# Patient Record
Sex: FEMALE | Race: WHITE | Employment: FULL TIME | URBAN - METROPOLITAN AREA
[De-identification: names, ages, dates, MRNs, and addresses within clinical notes are randomized per-mention and may not be internally consistent; named-entity substitution may affect disease eponyms.]

---

## 2017-03-03 ENCOUNTER — ALLSCRIPTS OFFICE VISIT (OUTPATIENT)
Dept: OTHER | Facility: OTHER | Age: 37
End: 2017-03-03

## 2017-04-10 ENCOUNTER — ALLSCRIPTS OFFICE VISIT (OUTPATIENT)
Dept: OTHER | Facility: OTHER | Age: 37
End: 2017-04-10

## 2017-10-19 ENCOUNTER — GENERIC CONVERSION - ENCOUNTER (OUTPATIENT)
Dept: OTHER | Facility: OTHER | Age: 37
End: 2017-10-19

## 2017-10-19 PROCEDURE — 87624 HPV HI-RISK TYP POOLED RSLT: CPT | Performed by: OBSTETRICS & GYNECOLOGY

## 2017-10-19 PROCEDURE — G0143 SCR C/V CYTO,THINLAYER,RESCR: HCPCS | Performed by: OBSTETRICS & GYNECOLOGY

## 2017-10-22 ENCOUNTER — LAB REQUISITION (OUTPATIENT)
Dept: LAB | Facility: HOSPITAL | Age: 37
End: 2017-10-22
Payer: COMMERCIAL

## 2017-10-22 DIAGNOSIS — Z01.419 ENCOUNTER FOR GYNECOLOGICAL EXAMINATION WITHOUT ABNORMAL FINDING: ICD-10-CM

## 2017-10-24 LAB — HPV RRNA GENITAL QL NAA+PROBE: NORMAL

## 2017-10-30 LAB
LAB AP GYN PRIMARY INTERPRETATION: NORMAL
Lab: NORMAL

## 2017-10-31 ENCOUNTER — GENERIC CONVERSION - ENCOUNTER (OUTPATIENT)
Dept: OTHER | Facility: OTHER | Age: 37
End: 2017-10-31

## 2017-11-09 ENCOUNTER — LAB REQUISITION (OUTPATIENT)
Dept: LAB | Facility: HOSPITAL | Age: 37
End: 2017-11-09
Payer: COMMERCIAL

## 2017-11-09 ENCOUNTER — GENERIC CONVERSION - ENCOUNTER (OUTPATIENT)
Dept: OTHER | Facility: OTHER | Age: 37
End: 2017-11-09

## 2017-11-09 DIAGNOSIS — N92.1 EXCESSIVE AND FREQUENT MENSTRUATION WITH IRREGULAR CYCLE: ICD-10-CM

## 2017-11-09 DIAGNOSIS — E66.01 MORBID (SEVERE) OBESITY DUE TO EXCESS CALORIES (HCC): ICD-10-CM

## 2017-11-09 PROCEDURE — 88305 TISSUE EXAM BY PATHOLOGIST: CPT | Performed by: OBSTETRICS & GYNECOLOGY

## 2017-11-15 ENCOUNTER — GENERIC CONVERSION - ENCOUNTER (OUTPATIENT)
Dept: OTHER | Facility: OTHER | Age: 37
End: 2017-11-15

## 2018-01-11 NOTE — RESULT NOTES
Verified Results  (1) THIN PREP PAP WITH IMAGING [id number] 07:47AM Juan Lomax     Test Name Result Flag Reference   LAB AP CASE REPORT (Report)     Gynecologic Cytology Report            Case: [id number]                  Authorizing Provider: Stephanie Winters MD     Collected:      10/19/2017           First Screen:     JESSICA Liz    Received:      10/23/2017 1042        Specimen:  LIQUID-BASED PAP, SCREENING, Cervix   HPV HIGH RISK RESULT (Report)     HPV, High Risk: HPV NEG, HPV16 NEG, HPV18 NEG      Other High Risk HPV Negative, HPV 16 Negative, HPV 18 Negative  HPV types: 16,18,31,33,35,39,45,51,52,56,58,59,66 and 68 DNA are undetectable or below the pre-set threshold  [employer] FDA approved Jose 4800 is utilized with strict adherence to the manufacturers instruction  manual to test for the presence of High-Risk HPV DNA, as well as HPV 16 and HPV 18  This instrument  has been validated by our laboratory and/or by the   A negative result does not preclude the presence of HPV infection because results depend on adequate  specimen collection, absence of inhibitors and sufficient DNA to be detected  Additionally, HPV negative  results are not intended to prevent women from proceeding to colposcopy if clinically warranted  Positive HPV test results indicate the presence of any one or more of the high risk types, but since patients  are often co-infected with low-risk types it does not rule out the presence of low-risk types in patients  with mixed infections  LAB AP GYN PRIMARY INTERPRETATION      Negative for intraepithelial lesion or malignancy  Electronically signed by JESSICA Liz on 10/30/2017 at 7:47 AM   LAB AP GYN SPECIMEN ADEQUACY      Satisfactory for evaluation  Endocervical/transformation zone component present  LAB AP GYN NOTE      This specimen was analyzed by the Thin Prep Imaging System   Due to   technical Imaging issues or the physical properties of the slide specimen,   comprehensive manual rescreening by a Cytotechnologist, and/or Pathologist   was indicated  LAB AP GYN ADDITIONAL INFORMATION (Report)     [employer]'s FDA approved ,  and ThinPrep Imaging System are   utilized with strict adherence to the 's instruction manual to   prepare gynecologic and non-gynecologic cytology specimens for the   production of ThinPrep slides as well as for gynecologic ThinPrep imaging  These processes have been validated by our laboratory and/or by the     The Pap test is not a diagnostic procedure and should not be used as the   sole means to detect cervical cancer  It is only a screening procedure to   aid in the detection of cervical cancer and its precursors  Both   false-negative and false-positive results have been experienced  Your   patient's test result should be interpreted in this context together with   the history and clinical findings

## 2018-01-13 NOTE — RESULT NOTES
Verified Results  (1) TISSUE EXAM [id number] 04:40PM Shaka Officer Order Number: [id number]     Test Name Result Flag Reference   LAB AP CASE REPORT (Report)     Surgical Pathology Report             Case: [id number]                   Authorizing Provider: Akshat Gonzales MD     Collected:      11/09/2017 1640        Pathologist:      Fela Williamson MD   Received:      11/13/2017 0800        Specimen:  Endometrium   LAB AP FINAL DIAGNOSIS      A  Endometrium, biopsy:  - Late proliferative/early secretory phase endometrium (transitional   phase), negative for hyperplasia or carcinoma  Electronically signed by Fela Williamson MD on 11/15/2017 at 12:49 PM   LAB AP SURGICAL ADDITIONAL INFORMATION (Report)     All controls performed with the immunohistochemical stains reported above   reacted appropriately  These tests were developed and their performance   characteristics determined by [facility] or   [facility]  They may not be cleared or approved by the [place]  Food and Drug Administration  The FDA has determined that such clearance   or approval is not necessary  These tests are used for clinical purposes  They should not be regarded as investigational or for research  This   laboratory has been approved by [place], designated as a high-complexity   laboratory and is qualified to perform these tests  Interpretation performed at [facility], [address] [place] [address], [id number]   LAB AP GROSS DESCRIPTION (Report)     A  The specimen is received in formalin, labeled with the patient's name   and hospital number, and is designated endometrium  It consists of a   2 0 x 1 5 x 0 5 cm aggregate of tan to red-brown cylinders of soft tissue   averaging 0 2 centimeters in diameter  The specimen is filtered and   entirely submitted in cassette A1          Note: The estimated total formalin fixation time based upon information   provided by the submitting clinician and the standard processing schedule   is over 72 hours        [facility]   LAB AP CLINICAL INFORMATION      TW Order Number: [id number]

## 2018-01-14 VITALS
DIASTOLIC BLOOD PRESSURE: 76 MMHG | HEIGHT: 69 IN | BODY MASS INDEX: 43.4 KG/M2 | SYSTOLIC BLOOD PRESSURE: 128 MMHG | WEIGHT: 293 LBS

## 2018-01-15 VITALS
HEART RATE: 82 BPM | BODY MASS INDEX: 43.4 KG/M2 | SYSTOLIC BLOOD PRESSURE: 128 MMHG | WEIGHT: 293 LBS | HEIGHT: 69 IN | DIASTOLIC BLOOD PRESSURE: 78 MMHG

## 2018-01-22 VITALS
WEIGHT: 250 LBS | SYSTOLIC BLOOD PRESSURE: 108 MMHG | DIASTOLIC BLOOD PRESSURE: 70 MMHG | HEIGHT: 69 IN | BODY MASS INDEX: 37.03 KG/M2

## 2018-01-22 VITALS
WEIGHT: 254 LBS | OXYGEN SATURATION: 98 % | BODY MASS INDEX: 37.62 KG/M2 | DIASTOLIC BLOOD PRESSURE: 67 MMHG | HEIGHT: 69 IN | HEART RATE: 81 BPM | SYSTOLIC BLOOD PRESSURE: 118 MMHG

## 2018-06-05 ENCOUNTER — TELEPHONE (OUTPATIENT)
Dept: GYNECOLOGY | Facility: CLINIC | Age: 38
End: 2018-06-05

## 2018-06-05 DIAGNOSIS — N93.9 ABNORMAL UTERINE BLEEDING (AUB): Primary | ICD-10-CM

## 2018-06-05 RX ORDER — ACETAMINOPHEN AND CODEINE PHOSPHATE 120; 12 MG/5ML; MG/5ML
1 SOLUTION ORAL DAILY
Qty: 28 TABLET | Refills: 4 | Status: SHIPPED | OUTPATIENT
Start: 2018-06-05 | End: 2018-09-24 | Stop reason: SDUPTHER

## 2018-06-05 RX ORDER — ACETAMINOPHEN AND CODEINE PHOSPHATE 120; 12 MG/5ML; MG/5ML
1 SOLUTION ORAL DAILY
COMMUNITY
Start: 2017-03-03 | End: 2018-06-05 | Stop reason: SDUPTHER

## 2018-06-05 NOTE — TELEPHONE ENCOUNTER
Her 1st visit was 3/17-  So she was due March or April of this year for annual visit  Because of the issue with her  I can extended to October    Please explain

## 2018-09-24 ENCOUNTER — ANNUAL EXAM (OUTPATIENT)
Dept: GYNECOLOGY | Facility: CLINIC | Age: 38
End: 2018-09-24
Payer: COMMERCIAL

## 2018-09-24 VITALS
BODY MASS INDEX: 35.06 KG/M2 | SYSTOLIC BLOOD PRESSURE: 112 MMHG | HEIGHT: 67 IN | DIASTOLIC BLOOD PRESSURE: 64 MMHG | WEIGHT: 223.4 LBS

## 2018-09-24 DIAGNOSIS — Z01.419 ENCOUNTER FOR ANNUAL ROUTINE GYNECOLOGICAL EXAMINATION: Primary | ICD-10-CM

## 2018-09-24 DIAGNOSIS — N93.9 ABNORMAL UTERINE BLEEDING (AUB): ICD-10-CM

## 2018-09-24 DIAGNOSIS — Z72.0 TOBACCO ABUSE: ICD-10-CM

## 2018-09-24 DIAGNOSIS — Z98.84 HISTORY OF BARIATRIC SURGERY: ICD-10-CM

## 2018-09-24 PROCEDURE — 99395 PREV VISIT EST AGE 18-39: CPT | Performed by: OBSTETRICS & GYNECOLOGY

## 2018-09-24 RX ORDER — ACETAMINOPHEN AND CODEINE PHOSPHATE 120; 12 MG/5ML; MG/5ML
1 SOLUTION ORAL DAILY
Qty: 28 TABLET | Refills: 11 | Status: SHIPPED | OUTPATIENT
Start: 2018-09-24 | End: 2022-06-15

## 2018-09-24 NOTE — PROGRESS NOTES
Assessment/Plan:  NGE  BCM- Nor Qd  Tobacco abuse  S/p Gastric Sleeve- lost 130#'s  Co Testing q 3yrs- 10/20  RTO 1yr  SBE monthly  Exercise 3/wk  Calcium 1,000 mg/d with Vit D     Diagnoses and all orders for this visit:    Encounter for annual routine gynecological examination    Tobacco abuse    History of bariatric surgery    Abnormal uterine bleeding (AUB)  -     norethindrone (MICRONOR) 0 35 MG tablet; Take 1 tablet (0 35 mg total) by mouth daily              Subjective:        Patient ID: Adali Capellan is a 41 y o  female  [facility] BENNY presents for an annual visit as well as follow-up from an emergency room visit   At the time she was experiencing intermittent right lower quadrant pain which she originally thought was due to her menses which lasted from  to   When the right lower quadrant pain became more severe she was concerned that she might have appendicitis and went to the emergency room  She also had some mild nausea  She was evaluated and they were unable to delineate why she was having pain except that may be she experienced a ruptured ovarian cyst    There was some free fluid behind the uterus  There was a posterior 1 7 cm fibroid  The right ovary could not be seen  Blood work and cultures were negative  Her symptoms resolved over a 6 day period of time  Since last year she has lost 130 lb following the gastric sleeve procedure  She continues to smoke 2-3 cigarettes a day  She was recently diagnosed with iron deficiency by her gastric surgeon  The following portions of the patient's history were reviewed and updated as appropriate: She  has no past medical history on file    Patient Active Problem List    Diagnosis Date Noted    Encounter for annual routine gynecological examination 2018    Tobacco abuse 2018    History of bariatric surgery 2018     She  has a past surgical history that includes  section and GASTRECTOMY SLEEVE LAPAROSCOPIC  Her family history is not on file  She  reports that she has never smoked  She has never used smokeless tobacco  She reports that she does not use drugs  Her alcohol history is not on file  Current Outpatient Prescriptions   Medication Sig Dispense Refill    norethindrone (MICRONOR) 0 35 MG tablet Take 1 tablet (0 35 mg total) by mouth daily 28 tablet 11     No current facility-administered medications for this visit  Current Outpatient Prescriptions on File Prior to Visit   Medication Sig    [DISCONTINUED] norethindrone (MICRONOR) 0 35 MG tablet Take 1 tablet (0 35 mg total) by mouth daily     No current facility-administered medications on file prior to visit  She has No Known Allergies       Review of Systems   Constitutional: Negative for activity change, appetite change, fatigue and unexpected weight change  Eyes: Negative for visual disturbance  Respiratory: Negative for cough, chest tightness, shortness of breath and wheezing  Cardiovascular: Negative for chest pain, palpitations and leg swelling  Breast: Patient denies tenderness, nipple discharge, masses, or erythema  Gastrointestinal: Negative for abdominal distention, abdominal pain, blood in stool, constipation, diarrhea, nausea and vomiting  Endocrine: Negative for cold intolerance and heat intolerance  Genitourinary: Negative for decreased urine volume, difficulty urinating, dyspareunia, dysuria, frequency, hematuria, menstrual problem, pelvic pain, urgency, vaginal bleeding, vaginal discharge and vaginal pain  Musculoskeletal: Negative for arthralgias  Skin: Negative for rash  Neurological: Negative for weakness, light-headedness, numbness and headaches  Hematological: Does not bruise/bleed easily  Psychiatric/Behavioral: Negative for agitation, behavioral problems and sleep disturbance  The patient is not nervous/anxious            Objective:    Vitals:    09/24/18 1537   BP: 112/64   BP Location: Right arm   Patient Position: Sitting   Cuff Size: Standard   Weight: 101 kg (223 lb 6 4 oz)   Height: 5' 7" (1 702 m)            Physical Exam   Constitutional: She is oriented to person, place, and time  She appears well-developed and well-nourished  HENT:   Head: Normocephalic and atraumatic  Eyes: Conjunctivae and EOM are normal  Pupils are equal, round, and reactive to light  Neck: Normal range of motion  Neck supple  No tracheal deviation present  No thyromegaly present  Cardiovascular: Normal rate, regular rhythm and normal heart sounds  No murmur heard  Pulmonary/Chest: Effort normal and breath sounds normal  No respiratory distress  She has no wheezes  Right breast exhibits no inverted nipple, no mass, no nipple discharge, no skin change and no tenderness  Left breast exhibits no inverted nipple, no mass, no nipple discharge, no skin change and no tenderness  Breasts are symmetrical    Abdominal: Soft  Bowel sounds are normal  She exhibits no distension and no mass  There is no tenderness  Genitourinary: Vagina normal and uterus normal  Rectal exam shows no external hemorrhoid  No breast swelling, tenderness, discharge or bleeding  There is no rash, tenderness or lesion on the right labia  There is no rash, tenderness or lesion on the left labia  Uterus is not deviated, not enlarged and not tender  Cervix exhibits no motion tenderness and no discharge  Right adnexum displays no mass, no tenderness and no fullness  Left adnexum displays no mass, no tenderness and no fullness  Genitourinary Comments: Deep pelvis  Cervix visualized more easily this time  Musculoskeletal: Normal range of motion  Neurological: She is alert and oriented to person, place, and time  Skin: Skin is warm and dry  Psychiatric: She has a normal mood and affect  Her behavior is normal  Judgment and thought content normal    Nursing note and vitals reviewed

## 2021-04-08 DIAGNOSIS — Z23 ENCOUNTER FOR IMMUNIZATION: ICD-10-CM

## 2022-06-15 ENCOUNTER — OFFICE VISIT (OUTPATIENT)
Dept: OBGYN CLINIC | Facility: CLINIC | Age: 42
End: 2022-06-15

## 2022-06-15 VITALS
BODY MASS INDEX: 31.55 KG/M2 | HEIGHT: 69 IN | WEIGHT: 213 LBS | DIASTOLIC BLOOD PRESSURE: 67 MMHG | RESPIRATION RATE: 18 BRPM | HEART RATE: 59 BPM | SYSTOLIC BLOOD PRESSURE: 129 MMHG

## 2022-06-15 DIAGNOSIS — Z11.3 SCREEN FOR STD (SEXUALLY TRANSMITTED DISEASE): ICD-10-CM

## 2022-06-15 DIAGNOSIS — Z30.41 SURVEILLANCE FOR BIRTH CONTROL, ORAL CONTRACEPTIVES: ICD-10-CM

## 2022-06-15 DIAGNOSIS — Z12.4 CERVICAL CANCER SCREENING: ICD-10-CM

## 2022-06-15 DIAGNOSIS — Z12.31 ENCOUNTER FOR SCREENING MAMMOGRAM FOR MALIGNANT NEOPLASM OF BREAST: ICD-10-CM

## 2022-06-15 DIAGNOSIS — Z72.51 HIGH RISK HETEROSEXUAL BEHAVIOR: ICD-10-CM

## 2022-06-15 DIAGNOSIS — Z01.419 WOMEN'S ANNUAL ROUTINE GYNECOLOGICAL EXAMINATION: Primary | ICD-10-CM

## 2022-06-15 DIAGNOSIS — L40.9 PSORIASIS: ICD-10-CM

## 2022-06-15 PROCEDURE — 0503F POSTPARTUM CARE VISIT: CPT | Performed by: NURSE PRACTITIONER

## 2022-06-15 PROCEDURE — 99386 PREV VISIT NEW AGE 40-64: CPT | Performed by: NURSE PRACTITIONER

## 2022-06-15 RX ORDER — ACETAMINOPHEN AND CODEINE PHOSPHATE 120; 12 MG/5ML; MG/5ML
1 SOLUTION ORAL DAILY
Qty: 28 TABLET | Refills: 11 | Status: SHIPPED | OUTPATIENT
Start: 2022-06-15 | End: 2022-07-28 | Stop reason: SDUPTHER

## 2022-06-15 RX ORDER — UBIDECARENONE 75 MG
CAPSULE ORAL
COMMUNITY

## 2022-06-15 RX ORDER — OMEPRAZOLE 40 MG/1
1 CAPSULE, DELAYED RELEASE ORAL DAILY
COMMUNITY
Start: 2022-01-17

## 2022-06-15 RX ORDER — ERGOCALCIFEROL (VITAMIN D2) 10 MCG
TABLET ORAL
COMMUNITY

## 2022-06-15 RX ORDER — FOLIC ACID 1 MG/1
1 TABLET ORAL DAILY
COMMUNITY

## 2022-06-15 RX ORDER — DESOXIMETASONE 0.5 MG/G
0.05 CREAM TOPICAL 2 TIMES DAILY
Qty: 30 G | Refills: 0 | Status: SHIPPED | OUTPATIENT
Start: 2022-06-15

## 2022-06-15 RX ORDER — DESOXIMETASONE 0.5 MG/G
0.05 CREAM TOPICAL
COMMUNITY
End: 2022-06-15 | Stop reason: SDUPTHER

## 2022-06-15 RX ORDER — ASCORBIC ACID 500 MG
500 TABLET ORAL DAILY
COMMUNITY

## 2022-06-15 NOTE — PROGRESS NOTES
[address] Korey Belcher is a 40 y o  female who presents today for annual GYN exam   Her last pap smear was performed 10/2017 and result was NILM, HR-HPV negative  She reports no history of abnormal pap smears in her past  She has not received the HPV vaccine series  She has not yet had her first mammogram  She reports menses as normal   Patient's last menstrual period was 2022 (exact date)  Her general medical history has been reviewed and she reports it as follows:    History reviewed  No pertinent past medical history  Past Surgical History:   Procedure Laterality Date     SECTION      GASTRECTOMY SLEEVE LAPAROSCOPIC       OB History        2    Para   1    Term   1            AB   1    Living   1       SAB        IAB        Ectopic        Multiple        Live Births                   Social History     Tobacco Use    Smoking status: Current Every Day Smoker     Types: Cigarettes    Smokeless tobacco: Never Used   Substance Use Topics    Drug use: No     Social History     Substance and Sexual Activity   Sexual Activity Yes    Partners: Male    Birth control/protection: OCP     Cancer-related family history includes Cancer in her maternal grandfather and maternal grandmother  Current Outpatient Medications   Medication Instructions    ascorbic acid (VITAMIN C) 500 mg, Oral, Daily    Cholecalciferol 25 MCG (1000 UT) CHEW Oral    cyanocobalamin (VITAMIN B-12) 100 mcg tablet Oral    desoximetasone (TOPICORT) 0 05 % cream 0 42 application, Apply externally    folic acid (FOLVITE) 1 mg, Oral, Daily    Multiple Vitamin (Daily Value Multivitamin) TABS Oral    norethindrone (SHAROBEL) 0 35 mg, Oral, Daily    omeprazole (PriLOSEC) 40 MG capsule 1 capsule, Oral, Daily       Review of Systems:  Review of Systems   Constitutional: Negative  Gastrointestinal: Negative  Genitourinary: Negative  Skin: Negative          Physical Exam:  /67 (BP Location: Right arm, Patient Position: Sitting, Cuff Size: Adult)   Pulse 59   Resp 18   Ht 5' 9" (1 753 m)   Wt 96 6 kg (213 lb)   LMP 06/13/2022 (Exact Date)   BMI 31 45 kg/m²   Physical Exam  Constitutional:       General: She is not in acute distress  Appearance: Normal appearance  Genitourinary:      Vulva and bladder normal       No lesions in the vagina  No vaginal erythema or ulceration  Right Adnexa: not tender and no mass present  Left Adnexa: not tender and no mass present  No cervical motion tenderness or lesion  Uterus is not enlarged or tender  No uterine mass detected  Breasts:      Right: No mass, nipple discharge or skin change  Left: No mass, nipple discharge or skin change  Cardiovascular:      Rate and Rhythm: Normal rate and regular rhythm  Pulmonary:      Effort: Pulmonary effort is normal       Breath sounds: Normal breath sounds  Abdominal:      General: Abdomen is flat  Palpations: Abdomen is soft  Musculoskeletal:      Cervical back: Neck supple  Neurological:      Mental Status: She is alert  Skin:     General: Skin is warm and dry  Psychiatric:         Mood and Affect: Mood normal          Behavior: Behavior normal    Vitals reviewed  Assessment/Plan:   1  Normal well-woman GYN exam   2  Cervical cancer screening:  Normal cervical exam   Pap smear done with HPV co-testing  3  STD screening: Orders placed for vaginal GC/CT cultures  4  Breast cancer screening:  Normal breast exam   Order placed for bilateral screening mammogram   Reviewed breast self-awareness  5  Depression Screening: Patient's depression screening was assessed with a PHQ-2 score of 0  Their PHQ-9 score was 0  Clinically patient does not have depression  No treatment is required  6  BMI Counseling: Body mass index is 31 45 kg/m²  Discussed the patient's BMI with her   The BMI is above normal  Exercise recommendations include moderate aerobic physical activity for 150 minutes/week and exercising 3-5 times per week  7  Tobacco Cessation Counseling: Tobacco cessation counseling was not provided  The patient is sincerely urged to quit consumption of tobacco  She is not ready to quit tobacco  The numerous health risks of tobacco consumption were discussed  If she decides to quit, there are a number of helpful adjunctive aids, and she can see me to discuss nicotine replacement therapy, chantix, or bupropion anytime in the future  8  Contraception:  Patient is happy with POPs  No contraindications to POP use  Refills approved  9  One refill approved for psoriasis cream while awaiting PCP appointment  10  Discussed HPV vaccine  Will consider this option  11  Return to office in one year for annual exam or sooner if needed  Reviewed with patient that test results [facility] immediately, but that they will not necessarily be reviewed by me immediately  Explained that I will review results at my earliest opportunity and contact patient appropriately

## 2022-06-16 PROCEDURE — 87591 N.GONORRHOEAE DNA AMP PROB: CPT | Performed by: NURSE PRACTITIONER

## 2022-06-16 PROCEDURE — G0145 SCR C/V CYTO,THINLAYER,RESCR: HCPCS | Performed by: NURSE PRACTITIONER

## 2022-06-16 PROCEDURE — 87491 CHLMYD TRACH DNA AMP PROBE: CPT | Performed by: NURSE PRACTITIONER

## 2022-06-16 PROCEDURE — G0476 HPV COMBO ASSAY CA SCREEN: HCPCS | Performed by: NURSE PRACTITIONER

## 2022-06-17 LAB
C TRACH DNA SPEC QL NAA+PROBE: NEGATIVE
HPV HR 12 DNA CVX QL NAA+PROBE: NEGATIVE
HPV16 DNA CVX QL NAA+PROBE: NEGATIVE
HPV18 DNA CVX QL NAA+PROBE: NEGATIVE
N GONORRHOEA DNA SPEC QL NAA+PROBE: NEGATIVE

## 2022-06-22 DIAGNOSIS — B96.89 BV (BACTERIAL VAGINOSIS): Primary | ICD-10-CM

## 2022-06-22 DIAGNOSIS — N76.0 BV (BACTERIAL VAGINOSIS): Primary | ICD-10-CM

## 2022-06-22 LAB
LAB AP GYN PRIMARY INTERPRETATION: NORMAL
Lab: NORMAL
PATH INTERP SPEC-IMP: NORMAL

## 2022-06-22 RX ORDER — METRONIDAZOLE 500 MG/1
500 TABLET ORAL 2 TIMES DAILY
Qty: 14 TABLET | Refills: 0 | Status: SHIPPED | OUTPATIENT
Start: 2022-06-22 | End: 2022-06-29

## 2022-06-22 NOTE — RESULT ENCOUNTER NOTE
Spoke with pt regarding neg pap and sti results  Pap was suggestive of Bv, pt stated wanting treatment for possible symptoms  Confirmed with pt we would be calling her back when treatment is sent to the pharmacy   Pt stated understanding and had no further questions

## 2022-07-28 DIAGNOSIS — Z30.41 SURVEILLANCE FOR BIRTH CONTROL, ORAL CONTRACEPTIVES: ICD-10-CM

## 2022-07-28 RX ORDER — ACETAMINOPHEN AND CODEINE PHOSPHATE 120; 12 MG/5ML; MG/5ML
1 SOLUTION ORAL DAILY
Qty: 84 TABLET | Refills: 4 | Status: SHIPPED | OUTPATIENT
Start: 2022-07-28

## 2023-10-12 ENCOUNTER — TELEPHONE (OUTPATIENT)
Dept: OBGYN CLINIC | Facility: CLINIC | Age: 43
End: 2023-10-12

## 2023-10-12 DIAGNOSIS — Z30.41 SURVEILLANCE FOR BIRTH CONTROL, ORAL CONTRACEPTIVES: ICD-10-CM

## 2023-10-12 RX ORDER — NORETHINDRONE 0.35 MG/1
1 TABLET ORAL DAILY
Qty: 84 TABLET | Refills: 0 | Status: SHIPPED | OUTPATIENT
Start: 2023-10-12

## 2023-10-12 NOTE — TELEPHONE ENCOUNTER
Lvm for patient to advise refills for her birth control were sent to her pharmacy. Given office call back number to contact to schedule for annual exam when available.

## 2023-11-10 ENCOUNTER — ANNUAL EXAM (OUTPATIENT)
Dept: OBGYN CLINIC | Facility: CLINIC | Age: 43
End: 2023-11-10

## 2023-11-10 VITALS
WEIGHT: 230.4 LBS | DIASTOLIC BLOOD PRESSURE: 75 MMHG | BODY MASS INDEX: 34.13 KG/M2 | SYSTOLIC BLOOD PRESSURE: 138 MMHG | HEART RATE: 84 BPM | HEIGHT: 69 IN

## 2023-11-10 DIAGNOSIS — Z23 NEED FOR HPV VACCINATION: ICD-10-CM

## 2023-11-10 DIAGNOSIS — Z01.419 WOMEN'S ANNUAL ROUTINE GYNECOLOGICAL EXAMINATION: Primary | ICD-10-CM

## 2023-11-10 DIAGNOSIS — Z59.9 FINANCIAL DIFFICULTIES: ICD-10-CM

## 2023-11-10 DIAGNOSIS — Z59.41 FOOD INSECURITY: ICD-10-CM

## 2023-11-10 DIAGNOSIS — Z30.41 SURVEILLANCE FOR BIRTH CONTROL, ORAL CONTRACEPTIVES: ICD-10-CM

## 2023-11-10 DIAGNOSIS — Z12.39 ENCOUNTER FOR BREAST CANCER SCREENING USING NON-MAMMOGRAM MODALITY: ICD-10-CM

## 2023-11-10 DIAGNOSIS — Z12.4 CERVICAL CANCER SCREENING: ICD-10-CM

## 2023-11-10 PROCEDURE — S0612 ANNUAL GYNECOLOGICAL EXAMINA: HCPCS | Performed by: NURSE PRACTITIONER

## 2023-11-10 PROCEDURE — 90471 IMMUNIZATION ADMIN: CPT | Performed by: NURSE PRACTITIONER

## 2023-11-10 PROCEDURE — 90651 9VHPV VACCINE 2/3 DOSE IM: CPT | Performed by: NURSE PRACTITIONER

## 2023-11-10 RX ORDER — ACETAMINOPHEN AND CODEINE PHOSPHATE 120; 12 MG/5ML; MG/5ML
1 SOLUTION ORAL DAILY
Qty: 84 TABLET | Refills: 3 | Status: SHIPPED | OUTPATIENT
Start: 2023-11-10

## 2023-11-10 SDOH — ECONOMIC STABILITY - INCOME SECURITY: PROBLEM RELATED TO HOUSING AND ECONOMIC CIRCUMSTANCES, UNSPECIFIED: Z59.9

## 2023-11-10 SDOH — ECONOMIC STABILITY - FOOD INSECURITY: FOOD INSECURITY: Z59.41

## 2023-11-10 NOTE — PROGRESS NOTES
[address] [facility] Lexwy Matilda Moraes is a 44 y.o. female who presents today for annual GYN exam.  Her last pap smear was performed 2022 and result was NILM, HR-HPV. She reports no history of abnormal pap smears in her past. She has not received the HPV vaccine series. Her last mammogram was performed 10/23/23 and result was BI-RADS 1. She reports menses as regular. Patient's last menstrual period was 2023 (approximate). Her general medical history has been reviewed and she reports it as follows:    No past medical history on file. Past Surgical History:   Procedure Laterality Date     SECTION      GASTRECTOMY SLEEVE LAPAROSCOPIC       OB History          2    Para   1    Term   1            AB   1    Living   1         SAB        IAB        Ectopic        Multiple        Live Births                   Social History     Tobacco Use    Smoking status: Every Day     Types: Cigarettes    Smokeless tobacco: Never   Substance Use Topics    Drug use: No     Social History     Substance and Sexual Activity   Sexual Activity Yes    Partners: Male    Birth control/protection: OCP     Cancer-related family history includes Cancer in her maternal grandfather and maternal grandmother. Current Outpatient Medications   Medication Instructions    ascorbic acid (VITAMIN C) 500 mg, Oral, Daily    Cholecalciferol 25 MCG (1000 UT) CHEW Oral    cyanocobalamin (VITAMIN B-12) 100 mcg tablet Oral    desoximetasone (TOPICORT) 0.05 % cream 7.10 application. , Topical, 2 times daily    folic acid (FOLVITE) 1 mg, Oral, Daily    Multiple Vitamin (Daily Value Multivitamin) TABS Oral    norethindrone (JENCYCLA) 0.35 mg, Oral, Daily    omeprazole (PriLOSEC) 40 MG capsule 1 capsule, Oral, Daily       Review of Systems:  Review of Systems   Constitutional: Negative. Gastrointestinal: Negative. Genitourinary: Negative. Skin: Negative.         Physical Exam:  /75 (BP Location: Right arm, Patient Position: Sitting)   Pulse 84   Ht 5' 9" (1.753 m)   Wt 105 kg (230 lb 6.4 oz)   LMP 11/01/2023 (Approximate)   BMI 34.02 kg/m²   Physical Exam  Constitutional:       General: She is not in acute distress. Appearance: Normal appearance. Genitourinary:      Vulva and bladder normal.      No lesions in the vagina. No vaginal erythema or ulceration. Right Adnexa: not tender and no mass present. Left Adnexa: not tender and no mass present. No cervical motion tenderness or lesion. Uterus is not enlarged or tender. No uterine mass detected. Breasts:     Right: No mass, nipple discharge or skin change. Left: No mass, nipple discharge or skin change. Cardiovascular:      Rate and Rhythm: Normal rate and regular rhythm. Pulmonary:      Effort: Pulmonary effort is normal.      Breath sounds: Normal breath sounds. Abdominal:      General: Abdomen is flat. Palpations: Abdomen is soft. Musculoskeletal:      Cervical back: Neck supple. Neurological:      Mental Status: She is alert. Skin:     General: Skin is warm and dry. Psychiatric:         Mood and Affect: Mood normal.         Behavior: Behavior normal.   Vitals reviewed. Assessment/Plan:   1. Normal well-woman GYN exam.  2. Cervical cancer screening:  Normal cervical exam.  Pap smear up to date. Has not received HPV vaccine in the past, accepts vaccine today and first dose given. 3. STD screening:  Patient declines. 4. Breast cancer screening:  Normal breast exam. Scheduled for bilateral screening mammogram in 2024 with [facility]. Reviewed breast self-awareness. 5. Depression Screening: Patient's depression screening was assessed with a PHQ-2 score of 1. Their PHQ-9 score was 4. Clinically patient does not have depression. No treatment is required. 5. BMI Counseling: Body mass index is 34.02 kg/m². Discussed the patient's BMI with her.  The BMI is above normal. Nutrition recommendations include decreasing overall calorie intake, 3-5 servings of fruits/vegetables daily, increasing intake of lean protein, and reducing intake of saturated fat and trans fat. Exercise recommendations include moderate aerobic physical activity for 150 minutes/week and exercising 3-5 times per week. 6. Tobacco Cessation Counseling: Tobacco cessation counseling and education was provided. The patient is sincerely urged to quit consumption of tobacco. She is not ready to quit tobacco. The numerous health risks of tobacco consumption were discussed. If she decides to quit, there are a number of helpful adjunctive aids, and she can see me to discuss nicotine replacement therapy, chantix, or bupropion anytime in the future. 7. Contraception:  Doing well on OCPs, uses them for management of irregular menses. Partner has a vasectomy as well for contraception. 8. Return to office as scheduled to complete HPV vaccine series and in one year for annual exam.    Reviewed with patient that test results are available [email or web address] immediately, but that they will not necessarily be reviewed by me immediately. Explained that I will review results at my earliest opportunity and contact patient appropriately.

## 2023-11-10 NOTE — PATIENT INSTRUCTIONS
OBESITY     Obesity is defined as a body mass index (BMI) which is greater than 30. Your Body mass index is 34.02 kg/m². .    The risks of obesity include  many health problems, such as injuries or physical disability. You may need tests to check for the following:  Diabetes     High blood pressure or high cholesterol     Heart disease     Gallbladder or liver disease     Cancer of the colon, breast, prostate, liver, or kidney     Sleep apnea     Arthritis or gout    Seek care immediately if:   You have a severe headache, confusion, or difficulty speaking. You have weakness on one side of your body. You have chest pain, sweating, or shortness of breath. Contact your healthcare provider if:   You have symptoms of gallbladder or liver disease, such as pain in your upper abdomen. You have knee or hip pain and discomfort while walking. You have symptoms of diabetes, such as intense hunger and thirst, and frequent urination. You have symptoms of sleep apnea, such as snoring or daytime sleepiness. You have questions or concerns about your condition or care. Treatment for obesity  focuses on helping you lose weight to improve your health. Even a small decrease in BMI can reduce the risk for many health problems. Your healthcare provider will help you set a weight-loss goal.  Lifestyle changes  are the first step in treating obesity. These include making healthy food choices and getting regular physical activity. Your healthcare provider may suggest a weight-loss program that involves coaching, education, and therapy. Medicine  may help you lose weight when it is used with a healthy diet and physical activity. Surgery  can help you lose weight if you are very obese and have other health problems. There are several types of weight-loss surgery. Ask your healthcare provider for more information. Be successful losing weight:   Set small, realistic goals.   An example of a small goal is to walk for 20 minutes 5 days a week. Anther goal is to lose 5% of your body weight. Tell friends, family members, and coworkers about your goals  and ask for their support. Ask a friend to lose weight with you, or join a weight-loss support group. Identify foods or triggers that may cause you to overeat , and find ways to avoid them. Remove tempting high-calorie foods from your home and workplace. Place a bowl of fresh fruit on your kitchen counter. If stress causes you to eat, then find other ways to cope with stress. Keep a diary to track what you eat and drink. Also write down how many minutes of physical activity you do each day. Weigh yourself once a week and record it in your diary. Eating changes: You will need to eat 500 to 1,000 fewer calories each day than you currently eat to lose 1 to 2 pounds a week. The following changes will help you cut calories:  Eat smaller portions. Use small plates, no larger than 9 inches in diameter. Fill your plate half full of fruits and vegetables. Measure your food using measuring cups until you know what a serving size looks like. Eat 3 meals and 1 or 2 snacks each day. Plan your meals in advance. Carmen Avilez and eat at home most of the time. Eat slowly. Eat fruits and vegetables at every meal.  They are low in calories and high in fiber, which makes you feel full. Do not add butter, margarine, or cream sauce to vegetables. Use herbs to season steamed vegetables. Eat less fat and fewer fried foods. Eat more baked or grilled chicken and fish. These protein sources are lower in calories and fat than red meat. Limit fast food. Dress your salads with olive oil and vinegar instead of bottled dressing. Limit the amount of sugar you eat. Do not drink sugary beverages. Limit alcohol. Activity changes:  Physical activity is good for your body in many ways. It helps you burn calories and build strong muscles.  It decreases stress and depression, and improves your mood. It can also help you sleep better. Talk to your healthcare provider before you begin an exercise program.  Exercise for at least 30 minutes 5 days a week. Start slowly. Set aside time each day for physical activity that you enjoy and that is convenient for you. It is best to do both weight training and an activity that increases your heart rate, such as walking, bicycling, or swimming. Find ways to be more active. Do yard work and housecleaning. Walk up the stairs instead of using elevators. Spend your leisure time going to events that require walking, such as outdoor festivals or fairs. This extra physical activity can help you lose weight and keep it off. Follow up with your primary healthcare provider as directed. You may need to meet with a dietitian. Write down your questions so you remember to ask them during your visits. Cigarette Smoking and Your Health     What are the risks to my health if I smoke tobacco?  Nicotine and other chemicals found in tobacco damage every cell in your body. Even if you are a light smoker, you have an increased risk for cancer, heart disease, and lung disease. If you are pregnant or have diabetes, smoking increases your risk for complications. What are the benefits to my health if I stop smoking? You decrease respiratory symptoms such as coughing, wheezing, and shortness of breath. You reduce your risk for cancers of the lung, mouth, throat, kidney, bladder, pancreas, stomach, and cervix. If you already have cancer, you increase the benefits of chemotherapy. You also reduce your risk for cancer returning or a second cancer from developing. You reduce your risk for heart disease, blood clots, heart attack, and stroke. You reduce your risk for lung infections, and diseases such as pneumonia, asthma, chronic bronchitis, and emphysema. Your circulation improves. More oxygen can be delivered to your body.  If you have diabetes, you lower your risk for complications, such as kidney, artery, and eye diseases. You also lower your risk for nerve damage. Nerve damage can lead to amputations, poor vision, and blindness. You improve your body's ability to heal and to fight infections. What are the health benefits to others if I stop smoking? Tobacco is harmful to nonsmokers who breathe in your secondhand smoke. The following are ways the health of others around you may improve when you stop smoking: You lower the risks for lung cancer and heart disease in nonsmoking adults. If you are pregnant, you lower the risk for miscarriage, early delivery, low birth weight, and stillbirth. You also lower your baby's risk for SIDS, obesity, developmental delay, and neurobehavioral problems, such as ADHD. If you have children, you lower their risk for ear infections, colds, pneumonia, bronchitis, and asthma. How to Stop Smoking     You will improve your health and the health of others around you  if you stop smoking. Your risk for heart and lung disease, cancer, stroke, heart attack, and vision problems will also decrease. You can benefit from quitting no matter how long you have smoked. PREPARE to stop smoking. Nicotine is a highly addictive drug found in cigarettes. Withdrawal symptoms can happen when you stop smoking and make it hard to quit. These include anxiety, depression, irritability, trouble sleeping, and increased appetite. You increase your chances of success if you PREPARE to quit. Set a quit date. Dmitriy Parisi a date that is within the next 2 weeks. Do not pick a day that you think may be stressful or busy. Write down the day or [place] it on your calender. Tell friends and family that you plan to quit. Explain that you may have withdrawal symptoms when you try to quit. Ask them to support you. They may be able to encourage you and help reduce your stress to make it easier for you to quit.     Make a list of your reasons for quitting. Put the list somewhere you will see it every day, such as your refrigerator. You can look at the list when you have a craving. Remove all tobacco and nicotine products from your home, car, and workplace. Also, remove anything else that will tempt you to smoke, such as lighters, matches, or ashtrays. Clean your car, home, and places at work that smell like smoke. The smell of smoke can trigger a craving. Identify triggers that make you want to smoke. This may include activities, feelings, or people. Also write down 1 way you can deal with each of your triggers. For example, if you want to smoke as soon as you wake up, plan another activity during this time, such as exercise. Make a plan for how you will quit. Learn about the tools that can help you quit, such as medicine, counseling, or nicotine replacement therapy. Choose at least 2 options to help you quit. Tools to help you stop smoking:     Counseling  from a trained healthcare provider can provide you with support and skills to quit smoking. The provider will also teach you to manage your withdrawal symptoms and cravings. You may receive counseling from one counselor, in group therapy, or through phone therapy called a quit line. Nicotine replacement therapy (NRT)  such as nicotine patches, gum, or lozenges may help reduce your nicotine cravings. You may get these without a doctor's order. Do not use e-cigarettes or smokeless tobacco in place of cigarettes or to help you quit. They still contain nicotine. Prescription medicines  such as nasal sprays or nicotine inhalers may help reduce your withdrawal symptoms. Other medicines may also be used to reduce your urge to smoke. Ask your healthcare provider about these medicines. You may need to start certain medicines 2 weeks before your quit date for them to work well. Hypnosis  is a practice that helps guide you through thoughts and feelings.  Hypnosis may help decrease your cravings and make you more willing to quit. Acupuncture therapy  uses very thin needles to balance energy channels in the body. This is thought to help decrease cravings and symptoms of nicotine withdrawal.     Support groups  let you talk to others who are trying to quit or have already quit. It may be helpful to speak with others about how they quit. Manage your cravings:     Avoid situations, people, and places that tempt you to smoke. Go to nonsmoking places, such as libraries or restaurants. Understand what tempts you and try to avoid these things. Keep your hands busy. Hold things such as a stress ball or pen. Put candy or toothpicks in your mouth. Keep lollipops, sugarless gum, or toothpicks with you at all times. Do not have alcohol or caffeine. These drinks may tempt you to smoke. Drink healthy liquids such as water or juice instead. Reward yourself when you resist your cravings. Rewards will motivate you and help you stay positive. Do an activity that distracts you from your craving. Examples include going for a walk, exercising, or cleaning. Prevent weight gain after you quit:  You may gain a few pounds after you quit smoking. It is healthier for you to gain a few pounds than to continue to smoke. The following can help you prevent weight gain:    Eat healthy foods. These include fruits, vegetables, whole-grain breads, low-fat dairy products, beans, lean meats, and fish. Eat healthy snacks, such as low-fat yogurt, if you get hungry between meals. Drink water before, during, and between meals. This will make your stomach feel full and help prevent you from overeating. Ask your healthcare provider how much liquid to drink each day and which liquids are best for you. Exercise. Take a walk or do some kind of exercise every day. Ask your healthcare provider what exercise is right for you. This may help reduce your cravings and reduce stress.

## 2023-11-16 ENCOUNTER — PATIENT OUTREACH (OUTPATIENT)
Dept: OBGYN CLINIC | Facility: CLINIC | Age: 43
End: 2023-11-16

## 2023-11-24 ENCOUNTER — PATIENT OUTREACH (OUTPATIENT)
Dept: OBGYN CLINIC | Facility: CLINIC | Age: 43
End: 2023-11-24

## 2023-11-24 NOTE — LETTER
[id number] [facility]    Re: Care Coordination   11/24/2023           Dear Jarrett Timmons,          We tried to reach you by phone on 11/10 and 11/24/23 and was unfortunately unable to reach you. It is important that you contact the [facility] as soon as possible to: Penelope Mcgill, [phone].                Sincerely,         Won Trevino LCSW

## 2023-11-24 NOTE — PROGRESS NOTES
Chart review indicates that an order was placed 11/10 to follow up with pt regarding at risk responses to social determinants of health. [facility] attempted to outreach pt on 11/16 and was unable to get in touch with her. No prior OP [facility] involvement found. At [address] pt here for annual GYN exam. Financial resources and food insecurity noted. [facility] attempted to reach pt again. She did not , unable to leave . [facility] attempted x2. At this time [facility] will close case and send Unable to Reach Letter via [id number] as two unsuccessful attempts have been made to reach pt. Will remain available as needed in the future.

## 2024-01-10 ENCOUNTER — CLINICAL SUPPORT (OUTPATIENT)
Dept: OBGYN CLINIC | Facility: CLINIC | Age: 44
End: 2024-01-10

## 2024-01-10 DIAGNOSIS — Z23 NEED FOR HPV VACCINATION: Primary | ICD-10-CM

## 2024-01-10 PROCEDURE — 90651 9VHPV VACCINE 2/3 DOSE IM: CPT

## 2024-01-10 PROCEDURE — 90471 IMMUNIZATION ADMIN: CPT

## 2024-02-21 PROBLEM — Z01.419 ENCOUNTER FOR ANNUAL ROUTINE GYNECOLOGICAL EXAMINATION: Status: RESOLVED | Noted: 2018-09-24 | Resolved: 2024-02-21

## 2024-05-15 ENCOUNTER — CLINICAL SUPPORT (OUTPATIENT)
Dept: OBGYN CLINIC | Facility: CLINIC | Age: 44
End: 2024-05-15

## 2024-05-15 DIAGNOSIS — Z23 NEED FOR HPV VACCINE: Primary | ICD-10-CM

## 2024-05-15 PROCEDURE — 90651 9VHPV VACCINE 2/3 DOSE IM: CPT

## 2024-05-15 PROCEDURE — 90471 IMMUNIZATION ADMIN: CPT

## 2024-07-11 DIAGNOSIS — Z30.41 SURVEILLANCE FOR BIRTH CONTROL, ORAL CONTRACEPTIVES: ICD-10-CM

## 2024-07-11 RX ORDER — ACETAMINOPHEN AND CODEINE PHOSPHATE 120; 12 MG/5ML; MG/5ML
1 SOLUTION ORAL DAILY
Qty: 84 TABLET | Refills: 3 | Status: SHIPPED | OUTPATIENT
Start: 2024-07-11